# Patient Record
(demographics unavailable — no encounter records)

---

## 2024-11-29 NOTE — PHYSICAL EXAM
[de-identified] : Bilateral hips  Constitutional:  The patient is healthy-appearing and in no apparent distress.   Gait: The patient ambulates with a normal gait and no limp.  Cardiovascular System:  There is capillary refill less than 2 seconds.   Skin:  There is no skin abnormalities.  Lumbar Spine; There is no significance malalignment and no tenderness to the paraspinal musculature.  Bilateral hips:   Bony Palpation:  There is no tenderness of the iliac crest. There is no tenderness of the ASIS. There is no tenderness of the PSIS. There is no tenderness of the SI joint. There is no tenderness of the greater trochanter.   Soft Tissue Palpation:  There is no tenderness of the hip adductors. There is no tenderness of the hip abductors. There is no tenderness of the hamstrings.  There is no tenderness of the piriformis.  There is tenderness of the hip flexors.  Active Range of Motion:  There is decreased range of motion at the hip actively and passively with hip IR to 10 and ER to 25  Special Tests:  There is a negative Juan's test. There is a negative Ayo-Jamshid test.   Strength:  There is 5/5 hip flexion, adduction, abduction.    Psychiatric:  The patient demonstrates a normal mood and affect and is active and alert. [de-identified] : MRI bilateral hips:  There is moderate hip arthritis and AVN of the femoral head

## 2024-11-29 NOTE — HISTORY OF PRESENT ILLNESS
[de-identified] : Initial visit: Back Pain/ Bilateral Hips  Reason: No Injury Duration: 1 Year Prior studies: MRI, In chart Symptoms: Radiating down both legs, Stiffness, Sharpness, Limited Mobility Physical Therapy: last visit 9/2024 not helpful Aggravating Fx: Walking, Bending, Breifly standing, Going up and down the stairs Alleviating Fx:  pain, Medication, Ice Packs, Heating packs, Compound solution Pain level: 8/10 Pain medication: Tizanidine, Meloxicam, Gabapentin, Cyclobenzaprine Medical Hx: None Surgical Hx: None Allergies: Tizanidine- broke out in hives and rash -Undiagnosed

## 2024-11-29 NOTE — PHYSICAL EXAM
[de-identified] : Bilateral hips  Constitutional:  The patient is healthy-appearing and in no apparent distress.   Gait: The patient ambulates with a normal gait and no limp.  Cardiovascular System:  There is capillary refill less than 2 seconds.   Skin:  There is no skin abnormalities.  Lumbar Spine; There is no significance malalignment and no tenderness to the paraspinal musculature.  Bilateral hips:   Bony Palpation:  There is no tenderness of the iliac crest. There is no tenderness of the ASIS. There is no tenderness of the PSIS. There is no tenderness of the SI joint. There is no tenderness of the greater trochanter.   Soft Tissue Palpation:  There is no tenderness of the hip adductors. There is no tenderness of the hip abductors. There is no tenderness of the hamstrings.  There is no tenderness of the piriformis.  There is tenderness of the hip flexors.  Active Range of Motion:  There is decreased range of motion at the hip actively and passively with hip IR to 10 and ER to 25  Special Tests:  There is a negative Juan's test. There is a negative Ayo-Jamshdi test.   Strength:  There is 5/5 hip flexion, adduction, abduction.    Psychiatric:  The patient demonstrates a normal mood and affect and is active and alert. [de-identified] : MRI bilateral hips:  There is moderate hip arthritis and AVN of the femoral head

## 2024-11-29 NOTE — ASSESSMENT
[FreeTextEntry1] : Discussed at length with patient underlying hip arthritis and AVN and treatment options with consideration to THRs.  Patient given name for surgeon who performs THRs.

## 2024-11-29 NOTE — HISTORY OF PRESENT ILLNESS
[de-identified] : Initial visit: Back Pain/ Bilateral Hips  Reason: No Injury Duration: 1 Year Prior studies: MRI, In chart Symptoms: Radiating down both legs, Stiffness, Sharpness, Limited Mobility Physical Therapy: last visit 9/2024 not helpful Aggravating Fx: Walking, Bending, Breifly standing, Going up and down the stairs Alleviating Fx:  pain, Medication, Ice Packs, Heating packs, Compound solution Pain level: 8/10 Pain medication: Tizanidine, Meloxicam, Gabapentin, Cyclobenzaprine Medical Hx: None Surgical Hx: None Allergies: Tizanidine- broke out in hives and rash -Undiagnosed

## 2025-02-06 NOTE — HISTORY OF PRESENT ILLNESS
[Pain Location] : pain [] : right & left hip [8] : a current pain level of 8/10 [Constant] : ~He/She~ states the symptoms seem to be constant [Bending] : worsened by bending [Sitting] : worsened by sitting [Walking] : worsened by walking [None] : No relieving factors are noted [de-identified] : 02/04/2025:42-year-old male referred by Dr. Gonzalez for evaluation of bilateral hip osteoarthritis and osteonecrosis. He reports that symptoms came on  sometime in the spring of 2023 to the best of his recollection. Without history of injury or other inciting events, symptoms are fairly similar across both hips, but  when pressed, he reports that the right hip is slightly more symptomatic than the left.; though they are both highly symptomatic to him on a regular daily basis. He describes difficulty with standing, walking, and particularly bending to pick things up off the floor. He localizes the pain diffusely through both hips and both thighs.  Prior treatments have included physical therapy, and he remains on multimodal pain management, including Gabapentin, Meloxicam and Cyclobenzaprine as well  as Tylenol, with limited relief of symptoms. He denies past medical history, he has no allergies. He does report that in his late twenties through early thirties he  was a heavy alcohol user but is currently abstinent from tobacco, alcohol, and all illicit substances. He ambulates without the use of an assistive device. [de-identified] : sharp, achy, throbbing, and shooting

## 2025-02-06 NOTE — DISCUSSION/SUMMARY
[de-identified] : IMP: 43 y/o male with severe bilateral hip osteonecrosis and osteoarthritis. - He is indicated at this time for simultaneous bilateral total hip replacement. - We discussed the details of the procedure, the expected recovery period, and the expected outcome. We discussed the likelihood of satisfaction after complete recovery, and the potential causes of dissatisfaction. The importance of active patient participation in the rehabilitation protocol was emphasized, along with its influence on short and long-term outcomes. We discussed the risks, benefits, and alternatives of surgery at length. Specific risks of total hip replacement were discussed in detail. We discussed the risk of surgical site complications including but not limited to: surgical site infection, wound healing complications, bone fracture, tendon or ligament injury, neurovascular injury, hemorrhage, postoperative stiffness or instability, persistent pain, limb length discrepancy, and need for reoperation. We discussed surgical blood loss and the possible need for blood transfusion. We discussed the risk of perioperative medical complications, including but not limited to catheter-associated urinary tract infection, venous thromboembolism and other cardiopulmonary complications. We discussed anesthetic options and the risk of anesthesia-related complications. We discussed the potential benefits of surgery including the potential to improve the current clinical condition through operative intervention. I emphasized that there are alternatives to surgical intervention including continued conservative management, though such a course could yield less than optimal results in this particular patient. A model was used to demonstrate the operation and to discuss bearing surfaces of the implants. We discussed implant fixation methods; my plan would be to use fully cementless fixation in this case. We discussed the various surgical approaches to the hip; I think that an anterior approach would be appropriate in this case. We discussed that it is relatively common following anterior approach hip arthroplasty to develop numbness of the lateral thigh secondary to injury to the branches of the lateral femoral cutaneous nerve, which in most cases does improve over the course of the first postoperative year, but which can also be permanent. We discussed the durability of prosthetic hips and limitations related to wear, osteolysis and loosening. All questions were answered to the patient's satisfaction. The patient was given a copy of my preoperative packet with additional information about the procedure. I asked the patient to either call back or schedule a followup appointment for any additional questions or concerns regarding the procedure.  - We did have a discussion about the relative benefits of staged versus simultaneous bilateral surgery.  - In my opinion, he is young and healthy enough that he can withstand a simultaneous bilateral procedure; and given his degree of pain and disability arising from both hips, I think that this would be, on the margin, advisable, and he agreed.  - He'll be booked for surgery at a convenient time with routine medical clearance.

## 2025-02-06 NOTE — HISTORY OF PRESENT ILLNESS
[Pain Location] : pain [] : right & left hip [8] : a current pain level of 8/10 [Constant] : ~He/She~ states the symptoms seem to be constant [Bending] : worsened by bending [Sitting] : worsened by sitting [Walking] : worsened by walking [None] : No relieving factors are noted [de-identified] : 02/04/2025:42-year-old male referred by Dr. Gonzalez for evaluation of bilateral hip osteoarthritis and osteonecrosis. He reports that symptoms came on  sometime in the spring of 2023 to the best of his recollection. Without history of injury or other inciting events, symptoms are fairly similar across both hips, but  when pressed, he reports that the right hip is slightly more symptomatic than the left.; though they are both highly symptomatic to him on a regular daily basis. He describes difficulty with standing, walking, and particularly bending to pick things up off the floor. He localizes the pain diffusely through both hips and both thighs.  Prior treatments have included physical therapy, and he remains on multimodal pain management, including Gabapentin, Meloxicam and Cyclobenzaprine as well  as Tylenol, with limited relief of symptoms. He denies past medical history, he has no allergies. He does report that in his late twenties through early thirties he  was a heavy alcohol user but is currently abstinent from tobacco, alcohol, and all illicit substances. He ambulates without the use of an assistive device. [de-identified] : sharp, achy, throbbing, and shooting

## 2025-02-06 NOTE — PHYSICAL EXAM
[de-identified] : General appearance: well nourished and hydrated, pleasant, alert and oriented x 3, cooperative.   HEENT: normocephalic, EOM intact, wearing mask, external auditory canal clear.   Cardiovascular: no lower leg edema, no varicosities, dorsalis pedis pulses palpable and symmetric.   Lymphatics: no palpable lymphadenopathy, no lymphedema.   Neurologic: sensation is normal, no muscle weakness in upper or lower extremities, patella tendon reflexes present and symmetric.   Dermatologic: skin moist, warm, no rash.   Spine: cervical spine with normal lordosis and painless range of motion, thoracic spine with normal kyphosis and painless range of motion, lumbosacral spine with normal lordosis and painless range of motion.  No tenderness to palpation along midline spine and paraspinal musculature.  Sacroiliac joints nontender bilaterally. Negative SLR and crossed SLR tests bilaterally. Gait: He presents with no assistive device, he's slow and cautious to stand from a seated position, and he demonstrates a cautious gait pattern with a slight right-sided limp, he has a negative Trendelenburg sign bilaterally.  Limb lengths: clinically equal  Right hip: - Focal soft tissue swelling: none - Ecchymosis: none - Erythema: none - Wounds: none - Tenderness: none - ROM:    - Flexion: 90   - Extension: 0   - Adduction: 10   - Abduction: 15   - Internal rotation in 90 degrees of hip flexion: 10 degrees oblique external rotation.   - External rotation in 90 degrees of hip flexion: 45 - XAVIER: very painful - FADIR: very painful - Juan: negative - Stinchfield: positive - Flexor power: 3/5 - Abductor power: 3+/5  Left hip:  - Focal soft tissue swelling: none - Ecchymosis: none - Erythema: none - Wounds: none - Tenderness: none - ROM:    - Flexion: 60   - Extension: 0   - Adduction: 5   - Abduction: 15   - Internal rotation in 90 degrees of hip flexion: 20 degrees oblique external.   - External rotation in 90 degrees of hip flexion: 30 - XAVIER: very stiff and painful - FADIR: very stiff and painful - Juan: negative - Stinchfield: positive - Flexor power: 4/5 - Abductor power: 5/5 [de-identified] : A lateral view of the lumbosacral spine, weightbearing AP pelvis, and 2 additional views (frog lateral and false profile) of the (left/right) hip were interpreted by me and reviewed with the patient.  Location of imaging:  [facility] Date of exam: 02/04/2025  Lumbar spine -- Alignment: normal Spondylosis: mild multilevel spondylosis. Listhesis: grade one retrolithesis of L4-5.  Posterior elements: mild multilevel facet arthrosis.   Pelvic alignment: normal  Right hip -- Arthritis: moderate osteoarthritis TONNIS 2. Deformity: none Osteonecrosis: post-collapse superior femoral head osteonecrosis.  Left hip -- Arthritis: moderate osteoarthritis TONNIS 2. Deformity: none Osteonecrosis: post-collapse superior femoral head osteonecrosis.

## 2025-02-06 NOTE — PHYSICAL EXAM
[de-identified] : General appearance: well nourished and hydrated, pleasant, alert and oriented x 3, cooperative.   HEENT: normocephalic, EOM intact, wearing mask, external auditory canal clear.   Cardiovascular: no lower leg edema, no varicosities, dorsalis pedis pulses palpable and symmetric.   Lymphatics: no palpable lymphadenopathy, no lymphedema.   Neurologic: sensation is normal, no muscle weakness in upper or lower extremities, patella tendon reflexes present and symmetric.   Dermatologic: skin moist, warm, no rash.   Spine: cervical spine with normal lordosis and painless range of motion, thoracic spine with normal kyphosis and painless range of motion, lumbosacral spine with normal lordosis and painless range of motion.  No tenderness to palpation along midline spine and paraspinal musculature.  Sacroiliac joints nontender bilaterally. Negative SLR and crossed SLR tests bilaterally. Gait: He presents with no assistive device, he's slow and cautious to stand from a seated position, and he demonstrates a cautious gait pattern with a slight right-sided limp, he has a negative Trendelenburg sign bilaterally.  Limb lengths: clinically equal  Right hip: - Focal soft tissue swelling: none - Ecchymosis: none - Erythema: none - Wounds: none - Tenderness: none - ROM:    - Flexion: 90   - Extension: 0   - Adduction: 10   - Abduction: 15   - Internal rotation in 90 degrees of hip flexion: 10 degrees oblique external rotation.   - External rotation in 90 degrees of hip flexion: 45 - XAVIER: very painful - FADIR: very painful - Juan: negative - Stinchfield: positive - Flexor power: 3/5 - Abductor power: 3+/5  Left hip:  - Focal soft tissue swelling: none - Ecchymosis: none - Erythema: none - Wounds: none - Tenderness: none - ROM:    - Flexion: 60   - Extension: 0   - Adduction: 5   - Abduction: 15   - Internal rotation in 90 degrees of hip flexion: 20 degrees oblique external.   - External rotation in 90 degrees of hip flexion: 30 - XAVIER: very stiff and painful - FADIR: very stiff and painful - Juan: negative - Stinchfield: positive - Flexor power: 4/5 - Abductor power: 5/5 [de-identified] : A lateral view of the lumbosacral spine, weightbearing AP pelvis, and 2 additional views (frog lateral and false profile) of the (left/right) hip were interpreted by me and reviewed with the patient.  Location of imaging:  [facility] Date of exam: 02/04/2025  Lumbar spine -- Alignment: normal Spondylosis: mild multilevel spondylosis. Listhesis: grade one retrolithesis of L4-5.  Posterior elements: mild multilevel facet arthrosis.   Pelvic alignment: normal  Right hip -- Arthritis: moderate osteoarthritis TONNIS 2. Deformity: none Osteonecrosis: post-collapse superior femoral head osteonecrosis.  Left hip -- Arthritis: moderate osteoarthritis TONNIS 2. Deformity: none Osteonecrosis: post-collapse superior femoral head osteonecrosis.

## 2025-02-06 NOTE — PHYSICAL EXAM
[de-identified] : General appearance: well nourished and hydrated, pleasant, alert and oriented x 3, cooperative.   HEENT: normocephalic, EOM intact, wearing mask, external auditory canal clear.   Cardiovascular: no lower leg edema, no varicosities, dorsalis pedis pulses palpable and symmetric.   Lymphatics: no palpable lymphadenopathy, no lymphedema.   Neurologic: sensation is normal, no muscle weakness in upper or lower extremities, patella tendon reflexes present and symmetric.   Dermatologic: skin moist, warm, no rash.   Spine: cervical spine with normal lordosis and painless range of motion, thoracic spine with normal kyphosis and painless range of motion, lumbosacral spine with normal lordosis and painless range of motion.  No tenderness to palpation along midline spine and paraspinal musculature.  Sacroiliac joints nontender bilaterally. Negative SLR and crossed SLR tests bilaterally. Gait: He presents with no assistive device, he's slow and cautious to stand from a seated position, and he demonstrates a cautious gait pattern with a slight right-sided limp, he has a negative Trendelenburg sign bilaterally.  Limb lengths: clinically equal  Right hip: - Focal soft tissue swelling: none - Ecchymosis: none - Erythema: none - Wounds: none - Tenderness: none - ROM:    - Flexion: 90   - Extension: 0   - Adduction: 10   - Abduction: 15   - Internal rotation in 90 degrees of hip flexion: 10 degrees oblique external rotation.   - External rotation in 90 degrees of hip flexion: 45 - XAVIER: very painful - FADIR: very painful - Juan: negative - Stinchfield: positive - Flexor power: 3/5 - Abductor power: 3+/5  Left hip:  - Focal soft tissue swelling: none - Ecchymosis: none - Erythema: none - Wounds: none - Tenderness: none - ROM:    - Flexion: 60   - Extension: 0   - Adduction: 5   - Abduction: 15   - Internal rotation in 90 degrees of hip flexion: 20 degrees oblique external.   - External rotation in 90 degrees of hip flexion: 30 - XAVIER: very stiff and painful - FADIR: very stiff and painful - Juan: negative - Stinchfield: positive - Flexor power: 4/5 - Abductor power: 5/5 [de-identified] : A lateral view of the lumbosacral spine, weightbearing AP pelvis, and 2 additional views (frog lateral and false profile) of the (left/right) hip were interpreted by me and reviewed with the patient.  Location of imaging:  [facility] Date of exam: 02/04/2025  Lumbar spine -- Alignment: normal Spondylosis: mild multilevel spondylosis. Listhesis: grade one retrolithesis of L4-5.  Posterior elements: mild multilevel facet arthrosis.   Pelvic alignment: normal  Right hip -- Arthritis: moderate osteoarthritis TONNIS 2. Deformity: none Osteonecrosis: post-collapse superior femoral head osteonecrosis.  Left hip -- Arthritis: moderate osteoarthritis TONNIS 2. Deformity: none Osteonecrosis: post-collapse superior femoral head osteonecrosis.

## 2025-02-06 NOTE — DISCUSSION/SUMMARY
[de-identified] : IMP: 43 y/o male with severe bilateral hip osteonecrosis and osteoarthritis. - He is indicated at this time for simultaneous bilateral total hip replacement. - We discussed the details of the procedure, the expected recovery period, and the expected outcome. We discussed the likelihood of satisfaction after complete recovery, and the potential causes of dissatisfaction. The importance of active patient participation in the rehabilitation protocol was emphasized, along with its influence on short and long-term outcomes. We discussed the risks, benefits, and alternatives of surgery at length. Specific risks of total hip replacement were discussed in detail. We discussed the risk of surgical site complications including but not limited to: surgical site infection, wound healing complications, bone fracture, tendon or ligament injury, neurovascular injury, hemorrhage, postoperative stiffness or instability, persistent pain, limb length discrepancy, and need for reoperation. We discussed surgical blood loss and the possible need for blood transfusion. We discussed the risk of perioperative medical complications, including but not limited to catheter-associated urinary tract infection, venous thromboembolism and other cardiopulmonary complications. We discussed anesthetic options and the risk of anesthesia-related complications. We discussed the potential benefits of surgery including the potential to improve the current clinical condition through operative intervention. I emphasized that there are alternatives to surgical intervention including continued conservative management, though such a course could yield less than optimal results in this particular patient. A model was used to demonstrate the operation and to discuss bearing surfaces of the implants. We discussed implant fixation methods; my plan would be to use fully cementless fixation in this case. We discussed the various surgical approaches to the hip; I think that an anterior approach would be appropriate in this case. We discussed that it is relatively common following anterior approach hip arthroplasty to develop numbness of the lateral thigh secondary to injury to the branches of the lateral femoral cutaneous nerve, which in most cases does improve over the course of the first postoperative year, but which can also be permanent. We discussed the durability of prosthetic hips and limitations related to wear, osteolysis and loosening. All questions were answered to the patient's satisfaction. The patient was given a copy of my preoperative packet with additional information about the procedure. I asked the patient to either call back or schedule a followup appointment for any additional questions or concerns regarding the procedure.  - We did have a discussion about the relative benefits of staged versus simultaneous bilateral surgery.  - In my opinion, he is young and healthy enough that he can withstand a simultaneous bilateral procedure; and given his degree of pain and disability arising from both hips, I think that this would be, on the margin, advisable, and he agreed.  - He'll be booked for surgery at a convenient time with routine medical clearance.

## 2025-02-06 NOTE — END OF VISIT
[FreeTextEntry3] : Documented by Melissa Davis acting as a scribe for Dr. Erick Millan. 02/04/2025   All medical record entries made by the Scribe were at my, Dr. Erick Millan, direction and personally dictated by me on 02/04/2025. I have reviewed the chart and agree that the record accurately reflects my personal performance of the history, physical exam, assessment and plan. I have also personally directed, reviewed, and agreed with the chart.

## 2025-02-06 NOTE — HISTORY OF PRESENT ILLNESS
[Pain Location] : pain [] : right & left hip [8] : a current pain level of 8/10 [Constant] : ~He/She~ states the symptoms seem to be constant [Bending] : worsened by bending [Sitting] : worsened by sitting [Walking] : worsened by walking [None] : No relieving factors are noted [de-identified] : 02/04/2025:42-year-old male referred by Dr. Gonzalez for evaluation of bilateral hip osteoarthritis and osteonecrosis. He reports that symptoms came on  sometime in the spring of 2023 to the best of his recollection. Without history of injury or other inciting events, symptoms are fairly similar across both hips, but  when pressed, he reports that the right hip is slightly more symptomatic than the left.; though they are both highly symptomatic to him on a regular daily basis. He describes difficulty with standing, walking, and particularly bending to pick things up off the floor. He localizes the pain diffusely through both hips and both thighs.  Prior treatments have included physical therapy, and he remains on multimodal pain management, including Gabapentin, Meloxicam and Cyclobenzaprine as well  as Tylenol, with limited relief of symptoms. He denies past medical history, he has no allergies. He does report that in his late twenties through early thirties he  was a heavy alcohol user but is currently abstinent from tobacco, alcohol, and all illicit substances. He ambulates without the use of an assistive device. [de-identified] : sharp, achy, throbbing, and shooting

## 2025-02-06 NOTE — DISCUSSION/SUMMARY
[de-identified] : IMP: 41 y/o male with severe bilateral hip osteonecrosis and osteoarthritis. - He is indicated at this time for simultaneous bilateral total hip replacement. - We discussed the details of the procedure, the expected recovery period, and the expected outcome. We discussed the likelihood of satisfaction after complete recovery, and the potential causes of dissatisfaction. The importance of active patient participation in the rehabilitation protocol was emphasized, along with its influence on short and long-term outcomes. We discussed the risks, benefits, and alternatives of surgery at length. Specific risks of total hip replacement were discussed in detail. We discussed the risk of surgical site complications including but not limited to: surgical site infection, wound healing complications, bone fracture, tendon or ligament injury, neurovascular injury, hemorrhage, postoperative stiffness or instability, persistent pain, limb length discrepancy, and need for reoperation. We discussed surgical blood loss and the possible need for blood transfusion. We discussed the risk of perioperative medical complications, including but not limited to catheter-associated urinary tract infection, venous thromboembolism and other cardiopulmonary complications. We discussed anesthetic options and the risk of anesthesia-related complications. We discussed the potential benefits of surgery including the potential to improve the current clinical condition through operative intervention. I emphasized that there are alternatives to surgical intervention including continued conservative management, though such a course could yield less than optimal results in this particular patient. A model was used to demonstrate the operation and to discuss bearing surfaces of the implants. We discussed implant fixation methods; my plan would be to use fully cementless fixation in this case. We discussed the various surgical approaches to the hip; I think that an anterior approach would be appropriate in this case. We discussed that it is relatively common following anterior approach hip arthroplasty to develop numbness of the lateral thigh secondary to injury to the branches of the lateral femoral cutaneous nerve, which in most cases does improve over the course of the first postoperative year, but which can also be permanent. We discussed the durability of prosthetic hips and limitations related to wear, osteolysis and loosening. All questions were answered to the patient's satisfaction. The patient was given a copy of my preoperative packet with additional information about the procedure. I asked the patient to either call back or schedule a followup appointment for any additional questions or concerns regarding the procedure.  - We did have a discussion about the relative benefits of staged versus simultaneous bilateral surgery.  - In my opinion, he is young and healthy enough that he can withstand a simultaneous bilateral procedure; and given his degree of pain and disability arising from both hips, I think that this would be, on the margin, advisable, and he agreed.  - He'll be booked for surgery at a convenient time with routine medical clearance.

## 2025-02-18 NOTE — ASSESSMENT
[Patient Optimized for Surgery] : Patient optimized for surgery [No Further Testing Recommended] : no further testing recommended [FreeTextEntry4] : 42 yrs old M with pmx of osteonecrosis of both hip comes in for pre-op eval for BILATERAL HIP REPLACEMENT RCRI 0 points class I risk. Moderate functional status Pt is low risk for intermediate risk procedure. No interventions needed from medicine at this point of time.

## 2025-02-18 NOTE — HISTORY OF PRESENT ILLNESS
[No Pertinent Cardiac History] : no history of aortic stenosis, atrial fibrillation, coronary artery disease, recent myocardial infarction, or implantable device/pacemaker [(Patient denies any chest pain, claudication, dyspnea on exertion, orthopnea, palpitations or syncope)] : Patient denies any chest pain, claudication, dyspnea on exertion, orthopnea, palpitations or syncope [Moderate (4-6 METs)] : Moderate (4-6 METs) [No Pertinent Pulmonary History] : no history of asthma, COPD, sleep apnea, or smoking [FreeTextEntry1] : BILATERAL HIP REPLACEMENT [FreeTextEntry2] : 02/13/2025 [FreeTextEntry4] : 42 yrs old M with pmx of osteonecrosis of both hip comes in for pre-op eval for BILATERAL HIP REPLACEMENT No new complains.  Denies any chest pain, cough, fevers, abd pain, vomiting, diarrhea, rash, sob, palpitations. Moderate functional capacity: can walk 2-3 blocks limited due to hip pain.  No history with anesthesia use in the past. Denies any blood thinners or herbal meds use.

## 2025-04-07 NOTE — HISTORY OF PRESENT ILLNESS
[de-identified] : Patient presents for first post-operative visit of bilateral total hip replacement, direct anterior approach. Surgery was performed on 02/24/2025. Patient is 5.5 weeks status post. [de-identified] : 04/04/2025: 42-year-old male following up on his first post-operative visit of simultaneous bilateral total hip replacements on February 24th, 2025. He reports he's been doing well, he had missed his first post-operative appointment secondary to issues with transportation, but he reports that he was involved in home physical therapy up until about two weeks ago and since then he's been doing a self-directed home exercise program. He's ambulating without the use of any assistive devices at this point. He reports that his pain is minimal and well controlled with ibuprofen. He's walking without any assistive devices. He has no new complaints today. He does complain of some lateral proximal thigh and hip numbness on the left but not on the right. [de-identified] : Gait: stable, non antalgic gait pattern without any assistive device.  Limb lengths: clinically equal  Right hip: - Focal soft tissue swelling: none - Ecchymosis: none - Erythema: none - Wounds: well healed anterior incision, benign appearing. - Tenderness: none - ROM:    - Flexion: 110   - Extension: 0   - Adduction: 20   - Abduction: 25   - Internal rotation in 90 degrees of hip flexion: 5   - External rotation in 90 degrees of hip flexion: 55 - XAVIER: mildly stiff and mildly painful. - FADIR: mildly stiff and mildly painful. - Juan: negative - Stinchfield: mildly positive  - Flexor power: 5/5 - Abductor power: 5/5  Left hip:  - Focal soft tissue swelling: none - Ecchymosis: none - Erythema: none - Wounds: a well-approximated anterior incision. There's an area of superficial dehiscence involving the proximal 15-20mm with a dry fibrinous bed. There's no active or expressible drainage, there's no surrounding cellulitis, overall relatively benign appearing. - Tenderness: none - ROM:    - Flexion: 115   - Extension: 0   - Adduction: 15   - Abduction: 50   - Internal rotation in 90 degrees of hip flexion: 15   - External rotation in 90 degrees of hip flexion: 70 - XAVIER: mildly uncomfortable - FADIR: mildly uncomfortable - Juan: negative - Stinchfield: negative - Flexor power: 5/5 - Abductor power: 4+/5 [de-identified] : Bilateral hip x-rays were taken today. These demonstrate stable appearance of his bilateral total hip replacements as compared to prior imaging, without evidence of mechanical complications. [de-identified] : IMP: 42-year-old male now approximately 5.5 weeks status post bilateral total hip replacements. 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 [de-identified] : - I advised him to transition to outpatient physical therapy; a referral was provided today, as well as a home exercise program.  - He may continue to use Tylenol and Ibuprofen as needed.  - I recommended that he use some Bacitracin Ointment and Band-Aids over his proximal left hip wound dehiscence. - We reviewed the expectation for ongoing neurologic improvement for the LFCN symptoms on the left.  - He'll follow up in two months with repeat bilateral hip x-rays.

## 2025-04-07 NOTE — END OF VISIT
[FreeTextEntry3] : Documented by Melissa Davis acting as a scribe for Dr. Erick Millan. 04/04/2025  All medical record entries made by the Scribe were at my, Dr. Erick Millan, direction and personally dictated by me on 04/04/2025. I have reviewed the chart and agree that the record accurately reflects my personal performance of the history, physical exam, assessment and plan. I have also personally directed, reviewed, and agreed with the chart.

## 2025-04-07 NOTE — HISTORY OF PRESENT ILLNESS
[de-identified] : Patient presents for first post-operative visit of bilateral total hip replacement, direct anterior approach. Surgery was performed on 02/24/2025. Patient is 5.5 weeks status post. [de-identified] : 04/04/2025: 42-year-old male following up on his first post-operative visit of simultaneous bilateral total hip replacements on February 24th, 2025. He reports he's been doing well, he had missed his first post-operative appointment secondary to issues with transportation, but he reports that he was involved in home physical therapy up until about two weeks ago and since then he's been doing a self-directed home exercise program. He's ambulating without the use of any assistive devices at this point. He reports that his pain is minimal and well controlled with ibuprofen. He's walking without any assistive devices. He has no new complaints today. He does complain of some lateral proximal thigh and hip numbness on the left but not on the right. [de-identified] : Gait: stable, non antalgic gait pattern without any assistive device.  Limb lengths: clinically equal  Right hip: - Focal soft tissue swelling: none - Ecchymosis: none - Erythema: none - Wounds: well healed anterior incision, benign appearing. - Tenderness: none - ROM:    - Flexion: 110   - Extension: 0   - Adduction: 20   - Abduction: 25   - Internal rotation in 90 degrees of hip flexion: 5   - External rotation in 90 degrees of hip flexion: 55 - XAVIER: mildly stiff and mildly painful. - FADIR: mildly stiff and mildly painful. - Juan: negative - Stinchfield: mildly positive  - Flexor power: 5/5 - Abductor power: 5/5  Left hip:  - Focal soft tissue swelling: none - Ecchymosis: none - Erythema: none - Wounds: a well-approximated anterior incision. There's an area of superficial dehiscence involving the proximal 15-20mm with a dry fibrinous bed. There's no active or expressible drainage, there's no surrounding cellulitis, overall relatively benign appearing. - Tenderness: none - ROM:    - Flexion: 115   - Extension: 0   - Adduction: 15   - Abduction: 50   - Internal rotation in 90 degrees of hip flexion: 15   - External rotation in 90 degrees of hip flexion: 70 - XAVIER: mildly uncomfortable - FADIR: mildly uncomfortable - Juan: negative - Stinchfield: negative - Flexor power: 5/5 - Abductor power: 4+/5 [de-identified] : Bilateral hip x-rays were taken today. These demonstrate stable appearance of his bilateral total hip replacements as compared to prior imaging, without evidence of mechanical complications. [de-identified] : IMP: 42-year-old male now approximately 5.5 weeks status post bilateral total hip replacements. 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 [de-identified] : - I advised him to transition to outpatient physical therapy; a referral was provided today, as well as a home exercise program.  - He may continue to use Tylenol and Ibuprofen as needed.  - I recommended that he use some Bacitracin Ointment and Band-Aids over his proximal left hip wound dehiscence. - We reviewed the expectation for ongoing neurologic improvement for the LFCN symptoms on the left.  - He'll follow up in two months with repeat bilateral hip x-rays.